# Patient Record
(demographics unavailable — no encounter records)

---

## 2025-07-08 NOTE — DATA REVIEWED
[FreeTextEntry1] : 1. history obtained from primary caregiver as independent historian due to patient's developmental age and limited recall 2. referral documents reviewed

## 2025-07-08 NOTE — REVIEW OF SYSTEMS
[Negative] : Heme/Lymph [de-identified] : as per HPI  [de-identified] : as per HPI  [de-identified] : as per HPI

## 2025-07-08 NOTE — HISTORY OF PRESENT ILLNESS
[Snoring] : snoring [No Personal or Family History of Easy Bruising, Bleeding, or Issues with General Anesthesia] : No Personal or Family History of easy bruising, bleeding, or issues with general anesthesia [de-identified] : 4 year old male here with mom, referred by PMD Dr. Tony for sleep disordered breathing   1 week ago had viral symptoms and fever for 3 days, went to PMD, had double ear infection, and cough, was treated with antibiotics (mom gave 3 of 10 day course) and was given saline nebulizer for cough. Used for several days, stopped 4 days ago  +Snoring, no witnessed gasping, choking, or apneas Wakes up frequently  No enuresis  No daytime behaviors  No previous PSG  No chronic nasal congestion  Never used steroidal nasal sprays  No recent throat infections No hearing concerns  No speech concerns   Passed [facility], no NICU stay

## 2025-07-08 NOTE — CONSULT LETTER
[Dear  ___] : Dear  [unfilled], [Consult Letter:] : I had the pleasure of evaluating your patient, [unfilled]. [Please see my note below.] : Please see my note below. [Consult Closing:] : Thank you very much for allowing me to participate in the care of this patient.  If you have any questions, please do not hesitate to contact me. [Sincerely,] : Sincerely, [FreeTextEntry2] : Chavo Leal MD [id number] [place] [address]  [phone]